# Patient Record
Sex: FEMALE | Race: OTHER | URBAN - METROPOLITAN AREA
[De-identification: names, ages, dates, MRNs, and addresses within clinical notes are randomized per-mention and may not be internally consistent; named-entity substitution may affect disease eponyms.]

---

## 2023-07-06 ENCOUNTER — APPOINTMENT (OUTPATIENT)
Dept: URBAN - METROPOLITAN AREA CLINIC 232 | Age: 23
Setting detail: DERMATOLOGY
End: 2023-07-06

## 2023-07-06 DIAGNOSIS — D22 MELANOCYTIC NEVI: ICD-10-CM

## 2023-07-06 PROBLEM — D22.4 MELANOCYTIC NEVI OF SCALP AND NECK: Status: ACTIVE | Noted: 2023-07-06

## 2023-07-06 PROCEDURE — OTHER PHOTO-DOCUMENTATION: OTHER

## 2023-07-06 PROCEDURE — OTHER MIPS QUALITY: OTHER

## 2023-07-06 PROCEDURE — 99202 OFFICE O/P NEW SF 15 MIN: CPT

## 2023-07-06 PROCEDURE — OTHER OBSERVATION: OTHER

## 2023-07-06 PROCEDURE — OTHER COUNSELING: OTHER

## 2023-07-06 ASSESSMENT — LOCATION ZONE DERM: LOCATION ZONE: NECK

## 2023-07-06 ASSESSMENT — LOCATION SIMPLE DESCRIPTION DERM: LOCATION SIMPLE: LEFT ANTERIOR NECK

## 2023-07-06 ASSESSMENT — LOCATION DETAILED DESCRIPTION DERM: LOCATION DETAILED: LEFT INFERIOR ANTERIOR NECK

## 2023-07-06 NOTE — HPI: SKIN LESION (MOLE CHECK)
Hpi Title: Evaluation of a Skin Lesion
How Severe Are Your Spot(S)?: mild
Have Your Spot(S) Been Treated In The Past?: have been treated

## 2024-03-19 ENCOUNTER — APPOINTMENT (OUTPATIENT)
Dept: URBAN - METROPOLITAN AREA CLINIC 232 | Age: 24
Setting detail: DERMATOLOGY
End: 2024-03-19

## 2024-03-19 DIAGNOSIS — L30.9 DERMATITIS, UNSPECIFIED: ICD-10-CM

## 2024-03-19 DIAGNOSIS — L29.8 OTHER PRURITUS: ICD-10-CM

## 2024-03-19 PROCEDURE — 99213 OFFICE O/P EST LOW 20 MIN: CPT

## 2024-03-19 PROCEDURE — OTHER COUNSELING: OTHER

## 2024-03-19 PROCEDURE — OTHER MIPS QUALITY: OTHER

## 2024-03-19 PROCEDURE — OTHER PRESCRIPTION: OTHER

## 2024-03-19 RX ORDER — CLOBETASOL PROPIONATE 0.5 MG/G
CREAM TOPICAL BID
Qty: 60 | Refills: 3 | Status: ERX | COMMUNITY
Start: 2024-03-19

## 2024-03-19 ASSESSMENT — ITCH NUMERIC RATING SCALE: HOW SEVERE IS YOUR ITCHING?: 2

## 2024-03-19 ASSESSMENT — LOCATION DETAILED DESCRIPTION DERM
LOCATION DETAILED: LEFT ANTECUBITAL SKIN
LOCATION DETAILED: RIGHT POPLITEAL SKIN
LOCATION DETAILED: LEFT RADIAL DORSAL HAND

## 2024-03-19 ASSESSMENT — BSA RASH: BSA RASH: 2

## 2024-03-19 ASSESSMENT — LOCATION ZONE DERM
LOCATION ZONE: LEG
LOCATION ZONE: HAND
LOCATION ZONE: ARM

## 2024-03-19 ASSESSMENT — SEVERITY ASSESSMENT: SEVERITY: MILD

## 2024-03-19 ASSESSMENT — LOCATION SIMPLE DESCRIPTION DERM
LOCATION SIMPLE: LEFT HAND
LOCATION SIMPLE: RIGHT POPLITEAL SKIN
LOCATION SIMPLE: LEFT UPPER ARM

## 2024-05-01 ENCOUNTER — RX ONLY (RX ONLY)
Age: 24
End: 2024-05-01

## 2024-05-01 RX ORDER — PREDNISONE 10 MG/1
TABLET ORAL
Qty: 40 | Refills: 0 | Status: ERX | COMMUNITY
Start: 2024-05-01

## 2024-05-20 ENCOUNTER — APPOINTMENT (OUTPATIENT)
Dept: URBAN - METROPOLITAN AREA CLINIC 232 | Age: 24
Setting detail: DERMATOLOGY
End: 2024-05-20

## 2024-05-20 DIAGNOSIS — L30.9 DERMATITIS, UNSPECIFIED: ICD-10-CM

## 2024-05-20 PROCEDURE — 11104 PUNCH BX SKIN SINGLE LESION: CPT

## 2024-05-20 PROCEDURE — OTHER BIOPSY BY PUNCH METHOD: OTHER

## 2024-05-20 PROCEDURE — OTHER MIPS QUALITY: OTHER

## 2024-05-20 PROCEDURE — OTHER COUNSELING: OTHER

## 2024-05-20 ASSESSMENT — LOCATION ZONE DERM
LOCATION ZONE: ARM
LOCATION ZONE: LEG
LOCATION ZONE: HAND

## 2024-05-20 ASSESSMENT — LOCATION DETAILED DESCRIPTION DERM
LOCATION DETAILED: RIGHT POPLITEAL SKIN
LOCATION DETAILED: LEFT RADIAL DORSAL HAND
LOCATION DETAILED: LEFT ANTECUBITAL SKIN
LOCATION DETAILED: RIGHT ANTERIOR MEDIAL DISTAL THIGH

## 2024-05-20 ASSESSMENT — LOCATION SIMPLE DESCRIPTION DERM
LOCATION SIMPLE: LEFT UPPER ARM
LOCATION SIMPLE: RIGHT POPLITEAL SKIN
LOCATION SIMPLE: LEFT HAND
LOCATION SIMPLE: RIGHT THIGH

## 2024-05-20 NOTE — PROCEDURE: BIOPSY BY PUNCH METHOD
